# Patient Record
Sex: MALE | Race: WHITE | URBAN - METROPOLITAN AREA
[De-identification: names, ages, dates, MRNs, and addresses within clinical notes are randomized per-mention and may not be internally consistent; named-entity substitution may affect disease eponyms.]

---

## 2017-01-24 ENCOUNTER — TRANSFERRED RECORDS (OUTPATIENT)
Dept: HEALTH INFORMATION MANAGEMENT | Facility: CLINIC | Age: 82
End: 2017-01-24

## 2017-05-01 ENCOUNTER — TRANSFERRED RECORDS (OUTPATIENT)
Dept: HEALTH INFORMATION MANAGEMENT | Facility: CLINIC | Age: 82
End: 2017-05-01

## 2017-05-17 ENCOUNTER — HOSPITAL ENCOUNTER (INPATIENT)
Facility: CLINIC | Age: 82
LOS: 2 days | Discharge: HOME | DRG: 281 | End: 2017-05-19
Attending: EMERGENCY MEDICINE | Admitting: INTERNAL MEDICINE
Payer: MEDICARE

## 2017-05-17 ENCOUNTER — APPOINTMENT (OUTPATIENT)
Dept: GENERAL RADIOLOGY | Facility: CLINIC | Age: 82
DRG: 281 | End: 2017-05-17
Attending: EMERGENCY MEDICINE
Payer: MEDICARE

## 2017-05-17 DIAGNOSIS — R07.9 CHEST PAIN, UNSPECIFIED TYPE: ICD-10-CM

## 2017-05-17 DIAGNOSIS — I21.4 NSTEMI (NON-ST ELEVATED MYOCARDIAL INFARCTION) (H): Primary | ICD-10-CM

## 2017-05-17 LAB
ANION GAP SERPL CALCULATED.3IONS-SCNC: 4 MMOL/L (ref 3–14)
BASOPHILS # BLD AUTO: 0 10E9/L (ref 0–0.2)
BASOPHILS NFR BLD AUTO: 0.4 %
BUN SERPL-MCNC: 16 MG/DL (ref 7–30)
CALCIUM SERPL-MCNC: 8.5 MG/DL (ref 8.5–10.1)
CHLORIDE SERPL-SCNC: 106 MMOL/L (ref 94–109)
CO2 SERPL-SCNC: 29 MMOL/L (ref 20–32)
CREAT SERPL-MCNC: 0.78 MG/DL (ref 0.66–1.25)
DIFFERENTIAL METHOD BLD: NORMAL
EOSINOPHIL # BLD AUTO: 0.1 10E9/L (ref 0–0.7)
EOSINOPHIL NFR BLD AUTO: 1.6 %
ERYTHROCYTE [DISTWIDTH] IN BLOOD BY AUTOMATED COUNT: 14.2 % (ref 10–15)
GFR SERPL CREATININE-BSD FRML MDRD: NORMAL ML/MIN/1.7M2
GLUCOSE SERPL-MCNC: 94 MG/DL (ref 70–99)
HCT VFR BLD AUTO: 43.2 % (ref 40–53)
HGB BLD-MCNC: 13.9 G/DL (ref 13.3–17.7)
IMM GRANULOCYTES # BLD: 0 10E9/L (ref 0–0.4)
IMM GRANULOCYTES NFR BLD: 0.3 %
INR PPP: 2.9 (ref 0.86–1.14)
LYMPHOCYTES # BLD AUTO: 1.7 10E9/L (ref 0.8–5.3)
LYMPHOCYTES NFR BLD AUTO: 25.7 %
MCH RBC QN AUTO: 30.2 PG (ref 26.5–33)
MCHC RBC AUTO-ENTMCNC: 32.2 G/DL (ref 31.5–36.5)
MCV RBC AUTO: 94 FL (ref 78–100)
MONOCYTES # BLD AUTO: 1 10E9/L (ref 0–1.3)
MONOCYTES NFR BLD AUTO: 14.4 %
NEUTROPHILS # BLD AUTO: 3.9 10E9/L (ref 1.6–8.3)
NEUTROPHILS NFR BLD AUTO: 57.6 %
NRBC # BLD AUTO: 0 10*3/UL
NRBC BLD AUTO-RTO: 0 /100
PLATELET # BLD AUTO: 172 10E9/L (ref 150–450)
POTASSIUM SERPL-SCNC: 4.6 MMOL/L (ref 3.4–5.3)
RBC # BLD AUTO: 4.6 10E12/L (ref 4.4–5.9)
SODIUM SERPL-SCNC: 139 MMOL/L (ref 133–144)
TROPONIN I SERPL-MCNC: 0.08 UG/L (ref 0–0.04)
TROPONIN I SERPL-MCNC: 0.53 UG/L (ref 0–0.04)
WBC # BLD AUTO: 6.7 10E9/L (ref 4–11)

## 2017-05-17 PROCEDURE — 36415 COLL VENOUS BLD VENIPUNCTURE: CPT | Performed by: INTERNAL MEDICINE

## 2017-05-17 PROCEDURE — 84484 ASSAY OF TROPONIN QUANT: CPT | Performed by: INTERNAL MEDICINE

## 2017-05-17 PROCEDURE — A9270 NON-COVERED ITEM OR SERVICE: HCPCS | Mod: GY | Performed by: INTERNAL MEDICINE

## 2017-05-17 PROCEDURE — A9270 NON-COVERED ITEM OR SERVICE: HCPCS | Mod: GY | Performed by: EMERGENCY MEDICINE

## 2017-05-17 PROCEDURE — 93005 ELECTROCARDIOGRAM TRACING: CPT

## 2017-05-17 PROCEDURE — 12000007 ZZH R&B INTERMEDIATE

## 2017-05-17 PROCEDURE — 99285 EMERGENCY DEPT VISIT HI MDM: CPT | Mod: 25

## 2017-05-17 PROCEDURE — 99223 1ST HOSP IP/OBS HIGH 75: CPT | Mod: AI | Performed by: INTERNAL MEDICINE

## 2017-05-17 PROCEDURE — 71020 XR CHEST 2 VW: CPT

## 2017-05-17 PROCEDURE — 25000132 ZZH RX MED GY IP 250 OP 250 PS 637: Mod: GY | Performed by: EMERGENCY MEDICINE

## 2017-05-17 PROCEDURE — 85610 PROTHROMBIN TIME: CPT | Performed by: EMERGENCY MEDICINE

## 2017-05-17 PROCEDURE — 80048 BASIC METABOLIC PNL TOTAL CA: CPT | Performed by: EMERGENCY MEDICINE

## 2017-05-17 PROCEDURE — 25000132 ZZH RX MED GY IP 250 OP 250 PS 637: Mod: GY | Performed by: INTERNAL MEDICINE

## 2017-05-17 PROCEDURE — 84145 PROCALCITONIN (PCT): CPT | Performed by: INTERNAL MEDICINE

## 2017-05-17 PROCEDURE — 85025 COMPLETE CBC W/AUTO DIFF WBC: CPT | Performed by: EMERGENCY MEDICINE

## 2017-05-17 PROCEDURE — 84484 ASSAY OF TROPONIN QUANT: CPT | Performed by: EMERGENCY MEDICINE

## 2017-05-17 RX ORDER — HYDROMORPHONE HYDROCHLORIDE 1 MG/ML
.3-.5 INJECTION, SOLUTION INTRAMUSCULAR; INTRAVENOUS; SUBCUTANEOUS
Status: DISCONTINUED | OUTPATIENT
Start: 2017-05-17 | End: 2017-05-19 | Stop reason: HOSPADM

## 2017-05-17 RX ORDER — ONDANSETRON 4 MG/1
4 TABLET, ORALLY DISINTEGRATING ORAL EVERY 6 HOURS PRN
Status: DISCONTINUED | OUTPATIENT
Start: 2017-05-17 | End: 2017-05-19 | Stop reason: HOSPADM

## 2017-05-17 RX ORDER — MULTIVITAMIN,THERAPEUTIC
1 TABLET ORAL DAILY
COMMUNITY

## 2017-05-17 RX ORDER — NALOXONE HYDROCHLORIDE 0.4 MG/ML
.1-.4 INJECTION, SOLUTION INTRAMUSCULAR; INTRAVENOUS; SUBCUTANEOUS
Status: DISCONTINUED | OUTPATIENT
Start: 2017-05-17 | End: 2017-05-19 | Stop reason: HOSPADM

## 2017-05-17 RX ORDER — POLYETHYLENE GLYCOL 3350 17 G/17G
17 POWDER, FOR SOLUTION ORAL DAILY PRN
Status: DISCONTINUED | OUTPATIENT
Start: 2017-05-17 | End: 2017-05-19 | Stop reason: HOSPADM

## 2017-05-17 RX ORDER — SOTALOL HYDROCHLORIDE 120 MG/1
120 TABLET ORAL 2 TIMES DAILY
Status: DISCONTINUED | OUTPATIENT
Start: 2017-05-17 | End: 2017-05-19 | Stop reason: HOSPADM

## 2017-05-17 RX ORDER — SIMVASTATIN 40 MG
40 TABLET ORAL AT BEDTIME
Status: DISCONTINUED | OUTPATIENT
Start: 2017-05-17 | End: 2017-05-19 | Stop reason: HOSPADM

## 2017-05-17 RX ORDER — EZETIMIBE AND SIMVASTATIN 10; 40 MG/1; MG/1
1 TABLET ORAL AT BEDTIME
Status: DISCONTINUED | OUTPATIENT
Start: 2017-05-17 | End: 2017-05-17

## 2017-05-17 RX ORDER — ONDANSETRON 2 MG/ML
4 INJECTION INTRAMUSCULAR; INTRAVENOUS EVERY 6 HOURS PRN
Status: DISCONTINUED | OUTPATIENT
Start: 2017-05-17 | End: 2017-05-19 | Stop reason: HOSPADM

## 2017-05-17 RX ORDER — ACETAMINOPHEN 325 MG/1
650 TABLET ORAL EVERY 4 HOURS PRN
Status: DISCONTINUED | OUTPATIENT
Start: 2017-05-17 | End: 2017-05-19 | Stop reason: HOSPADM

## 2017-05-17 RX ORDER — LIDOCAINE 40 MG/G
CREAM TOPICAL
Status: DISCONTINUED | OUTPATIENT
Start: 2017-05-17 | End: 2017-05-17

## 2017-05-17 RX ORDER — EZETIMIBE 10 MG/1
10 TABLET ORAL AT BEDTIME
Status: DISCONTINUED | OUTPATIENT
Start: 2017-05-17 | End: 2017-05-19 | Stop reason: HOSPADM

## 2017-05-17 RX ORDER — PROCHLORPERAZINE MALEATE 5 MG
5 TABLET ORAL EVERY 6 HOURS PRN
Status: DISCONTINUED | OUTPATIENT
Start: 2017-05-17 | End: 2017-05-19 | Stop reason: HOSPADM

## 2017-05-17 RX ORDER — NITROGLYCERIN 0.4 MG/1
0.4 TABLET SUBLINGUAL
Status: COMPLETED | OUTPATIENT
Start: 2017-05-17 | End: 2017-05-17

## 2017-05-17 RX ORDER — PROCHLORPERAZINE 25 MG
12.5 SUPPOSITORY, RECTAL RECTAL EVERY 12 HOURS PRN
Status: DISCONTINUED | OUTPATIENT
Start: 2017-05-17 | End: 2017-05-19 | Stop reason: HOSPADM

## 2017-05-17 RX ORDER — AMOXICILLIN 250 MG
1-2 CAPSULE ORAL 2 TIMES DAILY PRN
Status: DISCONTINUED | OUTPATIENT
Start: 2017-05-17 | End: 2017-05-19 | Stop reason: HOSPADM

## 2017-05-17 RX ORDER — LISINOPRIL 5 MG/1
5 TABLET ORAL DAILY
Status: DISCONTINUED | OUTPATIENT
Start: 2017-05-18 | End: 2017-05-19

## 2017-05-17 RX ORDER — ASPIRIN 325 MG
325 TABLET ORAL ONCE
Status: COMPLETED | OUTPATIENT
Start: 2017-05-17 | End: 2017-05-18

## 2017-05-17 RX ORDER — OXYCODONE HYDROCHLORIDE 5 MG/1
5-10 TABLET ORAL
Status: DISCONTINUED | OUTPATIENT
Start: 2017-05-17 | End: 2017-05-19 | Stop reason: HOSPADM

## 2017-05-17 RX ORDER — LIDOCAINE 40 MG/G
CREAM TOPICAL
Status: DISCONTINUED | OUTPATIENT
Start: 2017-05-17 | End: 2017-05-19 | Stop reason: HOSPADM

## 2017-05-17 RX ORDER — NITROGLYCERIN 0.4 MG/1
0.4 TABLET SUBLINGUAL EVERY 5 MIN PRN
Status: DISCONTINUED | OUTPATIENT
Start: 2017-05-17 | End: 2017-05-19 | Stop reason: HOSPADM

## 2017-05-17 RX ORDER — EZETIMIBE AND SIMVASTATIN 10; 40 MG/1; MG/1
1 TABLET ORAL AT BEDTIME
COMMUNITY

## 2017-05-17 RX ORDER — WARFARIN SODIUM 2.5 MG/1
2.5 TABLET ORAL ONCE
Status: COMPLETED | OUTPATIENT
Start: 2017-05-17 | End: 2017-05-17

## 2017-05-17 RX ORDER — ASPIRIN 81 MG/1
81 TABLET ORAL DAILY
Status: DISCONTINUED | OUTPATIENT
Start: 2017-05-18 | End: 2017-05-19 | Stop reason: HOSPADM

## 2017-05-17 RX ORDER — BISACODYL 10 MG
10 SUPPOSITORY, RECTAL RECTAL DAILY PRN
Status: DISCONTINUED | OUTPATIENT
Start: 2017-05-17 | End: 2017-05-19 | Stop reason: HOSPADM

## 2017-05-17 RX ORDER — HYDRALAZINE HYDROCHLORIDE 20 MG/ML
10 INJECTION INTRAMUSCULAR; INTRAVENOUS EVERY 4 HOURS PRN
Status: DISCONTINUED | OUTPATIENT
Start: 2017-05-17 | End: 2017-05-19 | Stop reason: HOSPADM

## 2017-05-17 RX ADMIN — WARFARIN SODIUM 2.5 MG: 2.5 TABLET ORAL at 22:06

## 2017-05-17 RX ADMIN — SIMVASTATIN 40 MG: 40 TABLET, FILM COATED ORAL at 22:06

## 2017-05-17 RX ADMIN — SOTALOL HYDROCHLORIDE 120 MG: 120 TABLET ORAL at 22:32

## 2017-05-17 RX ADMIN — NITROGLYCERIN 0.4 MG: 0.4 TABLET SUBLINGUAL at 18:52

## 2017-05-17 RX ADMIN — EZETIMIBE 10 MG: 10 TABLET ORAL at 22:06

## 2017-05-17 ASSESSMENT — ENCOUNTER SYMPTOMS
NAUSEA: 1
FATIGUE: 1
SHORTNESS OF BREATH: 1

## 2017-05-17 NOTE — IP AVS SNAPSHOT
MRN:[id number]                      After Visit Summary   5/17/2017    Fritz Kumar Sr.    MRN: [id number]           Thank you!     Thank you for choosing [facility] for your care. Our goal is always to provide you with excellent care. Hearing back from our patients is one way we can continue to improve our services. Please take a few minutes to complete the written survey that you may receive in the mail after you visit. If you would like to speak to someone directly about your visit please contact Patient Relations at [phone]. Thank you!          Patient Information     Date Of Birth          9/12/1930        Designated Caregiver       Most Recent Value    Caregiver    Will someone help with your care after discharge? yes    Name of designated caregiver Margaret    Phone number of caregiver [phone]    Caregiver address [address], [place] , [address]      About your hospital stay     You were admitted on:  May 17, 2017 You last received care in the:  [place] [address] [facility]    You were discharged on:  May 19, 2017       Who to Call     For medical emergencies, please call 911.  For non-urgent questions about your medical care, please call your primary care provider or clinic, None          Attending Provider     Provider Specialty    Sandra Avila MD Emergency Medicine    Don Tavera,  Internal Medicine       Primary Care Provider    Physician No Ref-Primary       No address on file        After Care Instructions     Activity       Your activity upon discharge: activity as tolerated            Diet       Follow this diet upon discharge:       Regular Diet Adult                  Follow-up Appointments     Follow-up and recommended labs and tests        See primary MD on return back home  New medications:  Baby aspirin and Imdur                  Warfarin Instruction     You have started taking a medicine called warfarin. This is a  "blood-thinning medicine (anticoagulant). It helps prevent and treat blood clots.      Before leaving the hospital, make sure you know how much to take and how long to take it.      You will need regular blood tests to make sure your blood is clotting safely. It is very important to see your doctor for regular blood tests.    Talk to your doctor before taking any new medicine (this includes over-the-counter drugs and herbal products). Many medicines can interact with warfarin. This may cause more bleeding or too much clotting.     Eating a lot of vitamin K--found in green, leafy vegetables--can change the way warfarin works in your body. Do NOT avoid these foods. Instead, try to eat the same amount each day.     Bleeding is the most common side-effect of warfarin. You may notice bleeding gums, a bloody nose, bruises and bleeding longer when you cut yourself. See a doctor at once if:   o You cough up blood  o You find blood in your stool (poop)  o You have a deep cut, or a cut that bleeds longer than 10 minutes   o You have a bad cut, hard fall, accident or hit your head (go to urgent care or the emergency room).    For women who can get pregnant: This medicine can harm an unborn baby. Be very careful not to get pregnant while taking this medicine. If you think you might be pregnant, call your doctor right away.    For more information, read \"Guide to Warfarin Therapy,  the booklet you received in the hospital.        Pending Results     No orders found from 5/15/2017 to 5/18/2017.            Statement of Approval     Ordered          05/19/17 1354  I have reviewed and agree with all the recommendations and orders detailed in this document.  EFFECTIVE NOW     Approved and electronically signed by:  Branden Kathleen MD             Admission Information     Date & Time Provider Department Dept. Phone    5/17/2017 Don Tavera, DO [place] [address] [facility] [phone]      Your Vitals Were     Blood " "Pressure Pulse Temperature Respirations Height Weight    93/55 (BP Location: Left arm) 62 97.1  F (36.2  C) (Oral) 16 1.778 m (5' 10\") 82.6 kg (182 lb)    Pulse Oximetry BMI (Body Mass Index)                95% 26.11 kg/m2          [employer] Information     [employer] lets you send messages to your doctor, view your test results, renew your prescriptions, schedule appointments and more. To sign up, go to [email or web address] . Click on \"Log in\" on the left side of the screen, which will take you to the Welcome page. Then click on \"Sign up Now\" on the right side of the page.     You will be asked to enter the access code listed below, as well as some personal information. Please follow the directions to create your username and password.     Your access code is: [email or web address]  Expires: 2017  2:06 PM     Your access code will  in 90 days. If you need help or a new code, please call your [facility] clinic or [phone].        Care EveryWhere ID     This is your Care EveryWhere ID. This could be used by other organizations to access your [facility] medical records  [id number]           Review of your medicines      START taking        Dose / Directions    aspirin 81 MG EC tablet        Dose:  81 mg   Take 1 tablet (81 mg) by mouth daily   Refills:  0       isosorbide mononitrate 30 MG 24 hr tablet   Commonly known as:  IMDUR        Dose:  15 mg   Take 0.5 tablets (15 mg) by mouth daily   Quantity:  30 tablet   Refills:  1         CONTINUE these medicines which have NOT CHANGED        Dose / Directions    ezetimibe-simvastatin 10-40 MG per tablet   Commonly known as:  VYTORIN        Dose:  1 tablet   Take 1 tablet by mouth At Bedtime   Refills:  0       multivitamin, therapeutic Tabs tablet        Dose:  1 tablet   Take 1 tablet by mouth daily   Refills:  0       PRESERVISION AREDS PO        Dose:  1 tablet   Take 1 tablet by mouth daily   Refills:  0       SAW PALMETTO CONCENTRATE OR        Dose:  1 tablet "   Take 1 tablet by mouth daily   Refills:  0       SOTALOL HCL PO        Dose:  120 mg   Take 120 mg by mouth 2 times daily   Refills:  0       WARFARIN SODIUM PO        Dose:  2.5 mg   Take 2.5 mg by mouth daily   Refills:  0            Where to get your medicines      These medications were sent to [place], [place] - [address] [facility]  [address] [facility] [address]     Phone:  [phone]     isosorbide mononitrate 30 MG 24 hr tablet         Some of these will need a paper prescription and others can be bought over the counter. Ask your nurse if you have questions.     You don't need a prescription for these medications     aspirin 81 MG EC tablet                Protect others around you: Learn how to safely use, store and throw away your medicines at www.disposemymeds.org.             Medication List: This is a list of all your medications and when to take them. Check marks below indicate your daily home schedule. Keep this list as a reference.      Medications           Morning Afternoon Evening Bedtime As Needed    aspirin 81 MG EC tablet   Take 1 tablet (81 mg) by mouth daily   Last time this was given:  81 mg on 5/19/2017  8:00 AM            8:00am                       ezetimibe-simvastatin 10-40 MG per tablet   Commonly known as:  VYTORIN   Take 1 tablet by mouth At Bedtime                        9:00pm           isosorbide mononitrate 30 MG 24 hr tablet   Commonly known as:  IMDUR   Take 0.5 tablets (15 mg) by mouth daily   Last time this was given:  15 mg on 5/19/2017 12:27 PM            8:00am                       multivitamin, therapeutic Tabs tablet   Take 1 tablet by mouth daily            8:00am                       PRESERVISION AREDS PO   Take 1 tablet by mouth daily            8:00am                       SAW PALMETTO CONCENTRATE OR   Take 1 tablet by mouth daily            8:00am                       SOTALOL HCL PO   Take 120 mg by mouth 2 times  daily   Last time this was given:  120 mg on 5/19/2017  8:00 AM            8:00am               9:00pm           WARFARIN SODIUM PO   Take 2.5 mg by mouth daily   Last time this was given:  2.5 mg on 5/18/2017  5:50 PM                    6:00pm                         More Information        Back at Home  Once you re home, your goal for the first week or so is to take it easy. Then, slowly return to regular activities. It may take about 4 to 8 weeks to get back to your normal routine. To ease the transition, allow yourself to rely on family and friends for support, and be easy on yourself.  Let friends and family support you    Don t try to do it all alone. Ask family or friends for help. They may be glad to do something to show their concern. For instance:    Let others help with chores, such as washing dishes, preparing meals, or buying groceries.    Ask a family member or friend to join you in relaxing activities, such as playing games or watching movies.  Be easy on yourself  As you begin your recovery, don t push yourself too hard. Remember, you re healing physically and emotionally. Keep these tips in mind:    Take your medications as prescribed by your doctor.    Avoid activities that may cause chest pain or shortness of breath.    Avoid exertion, excitement, and exposure to cold after a heavy meal.    If you re feeling low, don t beat yourself up. Take your recovery one day at a time and don t give in to these feelings by staying in bed. Be sure to get up and get dressed each morning.  For family and friends  Help your loved one ease into recovery:    Offer to drive your loved one to medical appointments.    Help your loved one remember to take medications.    Encourage your loved one to slowly be more independent.    Spend time relaxing with your loved one. You don t have to just sit around, try going for a walk.    7617-6642 [employer]. [address] [place] [address], [place] [address]. All rights  reserved. This information is not intended as a substitute for professional medical care. Always follow your healthcare professional's instructions.                Eating Heart-Healthy Foods  Eating has a big impact on your heart health. In fact, eating healthier can improve several of your heart risks at once. For instance, it helps you manage weight, cholesterol, and blood pressure. Here are ideas to help you make heart-healthy changes without giving up all the foods and flavors you love.  Getting started    Talk with your health care provider about eating plans, such as the DASH or Mediterranean diet. You may also be referred to a dietitian.    Change a few things at a time. Give yourself time to get used to a few eating changes before adding more.    Work to create a tasty, healthy eating plan that you can stick to for the rest of your life.    Goals for healthy eating  Below are some tips to improve your eating habits:    Limit saturated fats and trans fats. Saturated fats raise your levels of cholesterol, so keep these fats to a minimum. They are found in foods such as fatty meats, whole milk, cheese, and palm and coconut oils. Avoid trans fats because they lower good cholesterol as well as raise bad cholesterol. Trans fats are most often found in processed foods.    Reduce sodium (salt) intake. Eating too much salt may increase your blood pressure. Limit your sodium intake to 2,300 milligrams (mg) per day, or less if your health care provider recommends it. Dining out less often and eating fewer processed foods are two great ways to decrease the amount of salt you consume.    Managing calories. A calorie is a unit of energy. Your body burns calories for fuel, but if you eat more calories than your body burns, the extras are stored as fat. Your health care provider can help you create a diet plan to manage your calories. This will likely include eating healthier foods as well as exercising regularly. To help  you track your progress, keep a diary to record what you eat and how often you exercise.  Choose the right foods  Aim to make these foods staples of your diet. If you have diabetes, you may have different recommendations than what is listed here:    Fruits and vegetable provide plenty of nutrients without a lot of calories. At meals, fill half your plate with these foods. Split the other half of your plate between whole grains and lean protein.    Whole grains are high in fiber and rich in vitamins and nutrients. Good choices include whole-wheat bread, pasta, and brown rice.    Lean proteins give you nutrition with less fat. Good choices include fish, skinless chicken, and beans.    Low-fat or nonfat dairy provides nutrients without a lot of fat. Try low-fat or nonfat milk, cheese, or yogurt.    Healthy fats can be good for you in small amounts. These are unsaturated fats, such as olive oil, nuts, and fish. Try to have at least 2 servings per week of fatty fish such as salmon, sardines, mackerel, rainbow trout, and albacore tuna. These contain omega-3 fatty acids, which are good for your heart. Flaxseed is another source of a heart-healthy fat.  More on heart healthy eating    Read food labels  Healthy eating starts at the grocery store. Be sure to pay attention to food labels on packaged foods. Look for products that are high in fiber and protein, and low in saturated fat, cholesterol, and sodium. Avoid products that contain trans fat. And pay close attention to serving size. For instance, if you plan to eat two servings, double all the numbers on the label.  Prepare food right  A key part of healthy cooking is cutting down on added fat and salt. Look on the internet for lower-fat, lower-sodium recipes. Also, try these tips:    Remove fat from meat and skin from poultry before cooking.    Skim fat from the surface of soups and sauces.    Broil, boil, bake, steam, grill, and microwave food without added  fats.    Choose ingredients that spice up your food without adding calories, fat, or sodium. Try these items: horseradish, hot sauce, lemon, mustard, nonfat salad dressings, and vinegar. For salt-free herbs and spices, try basil, cilantro, cinnamon, pepper, and rosemary.    6061-4310 [employer]. [address] [place] [address], [place] [address]. All rights reserved. This information is not intended as a substitute for professional medical care. Always follow your healthcare professional's instructions.

## 2017-05-17 NOTE — ED PROVIDER NOTES
"  History     Chief Complaint:  Chest Pain      HPI   Fritz Kumar Sr. is a 86 year old male with a history of atrial flutter quad bypass with pacemaker who presents  to the emergency department today for evaluation of chest pain. Mr. Kumar is  visiting from [place] and came in today. The patient had a sudden onset of intermittent chest discomfort lasting few minutes starting at 10:00. Since, the patient's symptom's increased with frequency with shortness of breath, generalized weakness, and nausea. The patient notes he hasn't had heart issues since his pacemaker placement and came in for evaluation as a pre-caution.     Allergies:  Sulfa drugs    Medications:    Sotalol hcl po  Ezetimibe-simvastatin (vytorin) 10-40 mg per tablet  Warfarin sodium po  Multivitamin, therapeutic (thera-vit) tabs tablet  Saw palmetto, serenoa repens, (saw palmetto concentrate or)  Polyethylene glycol     Past Medical History:    Hypertension  Hyperlipidemia  Atrial flutter  Herniated disc  BPH  Pacemaker  Ischemic heart disease  A-fib  Dyslipidemia  COPD  SCOUT    Past Surgical History:    Appendectomy  Angipplasty  Joint replacement  Laminectomy  Heart bypass      Family History:    CAD    Social History:  Marital Status:   [2]  Tobacco: Former smoker  Alcohol: Positive  Presents with spouse and son.     Review of Systems   Constitutional: Positive for fatigue.   Respiratory: Positive for shortness of breath.    Cardiovascular: Positive for chest pain.   Gastrointestinal: Positive for nausea.   All other systems reviewed and are negative.    Physical Exam   First Vitals:  BP: (!) 135/94  Pulse: 62  Temp: 97.8  F (36.6  C)  Resp: 16  Height: 177.8 cm (5' 10\")  Weight: 82.6 kg (182 lb)  SpO2: 98 %    Physical Exam   Constitutional: He appears well-developed and well-nourished.   HENT:   Right Ear: External ear normal.   Left Ear: External ear normal.   Mouth/Throat: Oropharynx is clear and moist. No oropharyngeal exudate. "   Eyes: Conjunctivae are normal. Pupils are equal, round, and reactive to light. No scleral icterus.   Neck: Normal range of motion. Neck supple.   Cardiovascular: Normal rate, regular rhythm, normal heart sounds and intact distal pulses.  Exam reveals no gallop and no friction rub.    No murmur heard.  Pulmonary/Chest: Effort normal and breath sounds normal. No respiratory distress. He has no wheezes. He has no rales.   Abdominal: Soft. Bowel sounds are normal. He exhibits no distension and no mass. There is no tenderness.   Musculoskeletal: Normal range of motion. He exhibits no edema.   Neurological: He is alert.   Skin: Skin is warm and dry. No rash noted.   Psychiatric: He has a normal mood and affect.     Emergency Department Course   EC 16:35:04  Indication: chest pain  Findings:    Ventricular pace rhythm   Abnormal ECG..   Ventricular rate: 63 bpm  GA interval: * ms  QRS duration: 184 ms  QT/QTc: 510/521 ms  R-Axis: -47  ECG read at 16:35 by Dr. Avila.    Imaging:  Radiographic findings were communicated with the patient who voiced understanding of the findings.    Chest X-Ray. 2 Views:   Elevated left diaphragm. Left lower lobe infiltrate. Pacemaker in the heart. Patchy infiltrate at the right lung base. Sternal wires. Normal heart size and pulmonary vascularity.  Final reading per radiology.     Laboratory:  CBC:  WBC 6.7, HGB 13.9, , otherwise WNL  BMP: WNL. (Creatinine 0.78)  Troponin : 0.076  INR: 2.90    Interventions:  18:52 Nitroglycerin 0.4 mg Sublingual    Emergency Department Course:  16:35 EKG obtained in the ED, see results above.     Nursing notes and vitals reviewed.    17:23 I performed an exam of the patient as documented above.     Blood drawn. Urine collected. This was sent to the lab for further testing, results above.    The patient was taken for x-ray, see imaging results above.     The patient received the intervention(s) above.    19:53 Recheck patient. Findings  and plan explained to the Patient who consents to admission.     20:06 Discussed the patient with Dr. Tavera, who will admit the patient to a telemetry bed for further monitoring, evaluation, and treatment.    Impression & Plan    Medical Decision Making:  Fritz Kumar Sr. is a 86 year old male with history of CAD status post bypass as well as pacemaker pacemaker placement who is here of chest pain,. The chest pain pressure is mainly centered and is very minimal on the exam. Patient initially declined the nitroglycerin and pain medication. He has mildly elevated troponin, but the EKG was paced rhythm with no elevation to suggest an acute MI. However, due to the elevated troponin, I recommended elevation with serial troponin. He agreed to undergo evaluation in that aspect. At this pint, I do not see any need invasive intervention unless troponin elevates further or if he has more pain. He is admitted to observation unit tele. This was discussed with him and his wife and they were comfortable with plan. He is admitted by Dr. Tavera    Diagnosis:    ICD-10-CM    1. Chest pain, unspecified type R07.9        Disposition:  Admitted to telemetry bed under the care of Dr. Tavera    Scribe Disclosure:  I, Nadege Terrazas, am serving as a scribe at 5:23 PM on 5/17/2017 to document services personally performed by Sandra Avila MD, based on my observations and the provider's statements to me.  Nadege Terrazas  5/17/2017   [facility] EMERGENCY DEPARTMENT       Sandra Avila MD  05/17/17 1363

## 2017-05-17 NOTE — ED NOTES
Increased shortness of breath and chest pain started this am around 10am.  Associated weakness and nausea.

## 2017-05-17 NOTE — IP AVS SNAPSHOT
[place] [address] [facility]    [address]    [facility] [address]    Phone:  [phone]    Fax:  [phone]                                       After Visit Summary   5/17/2017    Fritz Kumar Sr.    MRN: [id number]           After Visit Summary Signature Page     I have received my discharge instructions, and my questions have been answered. I have discussed any challenges I see with this plan with the nurse or doctor.    ..........................................................................................................................................  Patient/Patient Representative Signature      ..........................................................................................................................................  Patient Representative Print Name and Relationship to Patient    ..................................................               ................................................  Date                                            Time    ..........................................................................................................................................  Reviewed by Signature/Title    ...................................................              ..............................................  Date                                                            Time

## 2017-05-18 ENCOUNTER — APPOINTMENT (OUTPATIENT)
Dept: NUCLEAR MEDICINE | Facility: CLINIC | Age: 82
DRG: 281 | End: 2017-05-18
Attending: INTERNAL MEDICINE
Payer: MEDICARE

## 2017-05-18 LAB
ANION GAP SERPL CALCULATED.3IONS-SCNC: 5 MMOL/L (ref 3–14)
BUN SERPL-MCNC: 16 MG/DL (ref 7–30)
CALCIUM SERPL-MCNC: 8.4 MG/DL (ref 8.5–10.1)
CHLORIDE SERPL-SCNC: 106 MMOL/L (ref 94–109)
CO2 SERPL-SCNC: 28 MMOL/L (ref 20–32)
CREAT SERPL-MCNC: 0.75 MG/DL (ref 0.66–1.25)
GFR SERPL CREATININE-BSD FRML MDRD: ABNORMAL ML/MIN/1.7M2
GLUCOSE SERPL-MCNC: 89 MG/DL (ref 70–99)
INR PPP: 2.9 (ref 0.86–1.14)
INTERPRETATION ECG - MUSE: NORMAL
INTERPRETATION ECG - MUSE: NORMAL
POTASSIUM SERPL-SCNC: 4.3 MMOL/L (ref 3.4–5.3)
PROCALCITONIN SERPL-MCNC: NORMAL NG/ML
SODIUM SERPL-SCNC: 139 MMOL/L (ref 133–144)
TROPONIN I SERPL-MCNC: 1.35 UG/L (ref 0–0.04)
TROPONIN I SERPL-MCNC: 6.83 UG/L (ref 0–0.04)

## 2017-05-18 PROCEDURE — 93005 ELECTROCARDIOGRAM TRACING: CPT

## 2017-05-18 PROCEDURE — 84484 ASSAY OF TROPONIN QUANT: CPT | Performed by: INTERNAL MEDICINE

## 2017-05-18 PROCEDURE — 93016 CV STRESS TEST SUPVJ ONLY: CPT | Performed by: INTERNAL MEDICINE

## 2017-05-18 PROCEDURE — 78452 HT MUSCLE IMAGE SPECT MULT: CPT

## 2017-05-18 PROCEDURE — A9270 NON-COVERED ITEM OR SERVICE: HCPCS | Mod: GY | Performed by: INTERNAL MEDICINE

## 2017-05-18 PROCEDURE — 36415 COLL VENOUS BLD VENIPUNCTURE: CPT | Performed by: INTERNAL MEDICINE

## 2017-05-18 PROCEDURE — 99233 SBSQ HOSP IP/OBS HIGH 50: CPT | Performed by: INTERNAL MEDICINE

## 2017-05-18 PROCEDURE — 99221 1ST HOSP IP/OBS SF/LOW 40: CPT | Performed by: INTERNAL MEDICINE

## 2017-05-18 PROCEDURE — 80048 BASIC METABOLIC PNL TOTAL CA: CPT | Performed by: INTERNAL MEDICINE

## 2017-05-18 PROCEDURE — 34300033 ZZH RX 343: Performed by: INTERNAL MEDICINE

## 2017-05-18 PROCEDURE — 78452 HT MUSCLE IMAGE SPECT MULT: CPT | Mod: 26 | Performed by: INTERNAL MEDICINE

## 2017-05-18 PROCEDURE — 25000132 ZZH RX MED GY IP 250 OP 250 PS 637: Mod: GY | Performed by: INTERNAL MEDICINE

## 2017-05-18 PROCEDURE — 12000007 ZZH R&B INTERMEDIATE

## 2017-05-18 PROCEDURE — 93018 CV STRESS TEST I&R ONLY: CPT | Performed by: INTERNAL MEDICINE

## 2017-05-18 PROCEDURE — 93010 ELECTROCARDIOGRAM REPORT: CPT | Performed by: INTERNAL MEDICINE

## 2017-05-18 PROCEDURE — 25000128 H RX IP 250 OP 636

## 2017-05-18 PROCEDURE — A9502 TC99M TETROFOSMIN: HCPCS | Performed by: INTERNAL MEDICINE

## 2017-05-18 PROCEDURE — 93017 CV STRESS TEST TRACING ONLY: CPT

## 2017-05-18 PROCEDURE — 85610 PROTHROMBIN TIME: CPT | Performed by: INTERNAL MEDICINE

## 2017-05-18 RX ORDER — REGADENOSON 0.08 MG/ML
INJECTION, SOLUTION INTRAVENOUS
Status: COMPLETED
Start: 2017-05-18 | End: 2017-05-18

## 2017-05-18 RX ORDER — WARFARIN SODIUM 2.5 MG/1
2.5 TABLET ORAL
Status: COMPLETED | OUTPATIENT
Start: 2017-05-18 | End: 2017-05-18

## 2017-05-18 RX ADMIN — SIMVASTATIN 40 MG: 40 TABLET, FILM COATED ORAL at 23:24

## 2017-05-18 RX ADMIN — Medication 32.1 MCI.: at 14:21

## 2017-05-18 RX ADMIN — ASPIRIN 81 MG: 81 TABLET, COATED ORAL at 08:54

## 2017-05-18 RX ADMIN — EZETIMIBE 10 MG: 10 TABLET ORAL at 23:24

## 2017-05-18 RX ADMIN — WARFARIN SODIUM 2.5 MG: 2.5 TABLET ORAL at 17:50

## 2017-05-18 RX ADMIN — ASPIRIN 325 MG ORAL TABLET 325 MG: 325 PILL ORAL at 00:40

## 2017-05-18 RX ADMIN — SOTALOL HYDROCHLORIDE 120 MG: 120 TABLET ORAL at 08:54

## 2017-05-18 RX ADMIN — NITROGLYCERIN 0.4 MG: 0.4 TABLET SUBLINGUAL at 04:41

## 2017-05-18 RX ADMIN — LISINOPRIL 5 MG: 5 TABLET ORAL at 08:54

## 2017-05-18 RX ADMIN — REGADENOSON 0.4 MG: 0.08 INJECTION, SOLUTION INTRAVENOUS at 14:10

## 2017-05-18 RX ADMIN — TETROFOSMIN 11 MCI.: 0.23 INJECTION, POWDER, LYOPHILIZED, FOR SOLUTION INTRAVENOUS at 12:32

## 2017-05-18 NOTE — CONSULTS
"CARDIOLOGY CONSULTATION       REQUESTING PHYSICIAN:  Dr. Branden Kathleen.       CHIEF COMPLAINT:  We were asked by Dr. Branden Kathleen to see Fritz (Óscar Kumar because of acute medical issues noted below including unstable angina, trace myocardial infarction, history of coronary artery disease.      Mr. Kumar is 86; he has been a relatively healthy man.  In 2002 in [place], he had a 4-vessel bypass; we do not have his anatomy at this time.  He subsequently has had no further angiograms, but he has had \"small heart attacks\" subsequently.  He has not carried a diagnosis of heart failure.      He was admitted to [facility] yesterday, 05/17/2017 with ongoing chest discomfort.  He has not had any chest pain recently.  He exercises 6 days a week on an exercise bike and has had no symptoms.  He and his wife live in [place], but they flew in yesterday to [place] to visit with family including children, grandchildren and great-grandchildren.      They were at [employer] doing some shopping when he noted the onset of a vague mid lower chest discomfort.  It did not go into the neck, jaw, arms or back, but it clearly present.  It hassled him on and off through the afternoon; he first noted the symptoms around 10:00 a.m.  By 4:00 in the afternoon, it had been recurrent and annoying enough that he finally admitted to his wife that he was uncomfortable and he also had associated symptoms of nausea.  No dyspnea, palpitations, dizziness, or syncope.  No lower leg edema.      He came to the emergency room; his EKG was not helpful as he has both an A-paced/V-paced rhythm with intermittent A-sense and a V-paced rhythm.  Troponins were trace positive at 0.07, that went 0.5, down to 1.3, and today 6.8.  He has had a vague sense of persistent symptoms, but not dramatically so.  He has had 2 nitroglycerins in the ER and last night; neither really seemed to change his symptoms at all.  They were very vague, very " fleeting.      PAST MEDICAL HISTORY:  Includes:     1.  Coronary artery disease with previous 5-vessel bypass.   2.  Previous myocardial infarction, status injuries.   3.  History of atrial flutter treated with sotalol.   4.  Hypertension.   5.  Hyperlipidemia.      MEDICATIONS PRIOR TO ADMISSION:   1.  Warfarin to an INR of 2.0-3.0.   2.  Sotalol 120 mg b.i.d., presumably for treatment of his atrial arrhythmias.   3.  Vytorin 10/40 mg a day.   4.  Vitamins daily.   5.  Saw palmetto daily.      SOCIAL HISTORY:  He is retired; he used to work for [employer].  He stopped smoking 40 years ago.  He has 1-2 alcoholic beverages most days of the week.      FAMILY HISTORY:  Positive for both mother and father having coronary disease.      REVIEW OF SYSTEMS:     IN GENERAL:  His energy is decent, he has not had angina until yesterday.  He has felt well and exercises regularly.   HEENT:  Negative.   UPPER GASTROINTESTINAL:  Negative.   LOWER GASTROINTESTINAL:  Negative.   GENITOURINARY:  Negative.   MUSCULOSKELETAL:  Negative except for some achiness of the right leg when he walks.    NEUROLOGIC, ENDOCRINE, PSYCHIATRIC:  Negative.      ALLERGIES:  None specified.      PHYSICAL EXAMINATION:   IN GENERAL:  Well-developed, well-nourished male.  He looks a bit younger than his stated age.   VITAL SIGNS:  Blood pressures have been normal in the 120//60.  O2 sats 95-97%, body weight on admission was 182 pounds.   HEAD:  Normal.   NECK:  Free of neck vein distention or bruit.   HEART:  Regular without gallop or murmur.   LUNGS:  Clear.  Sternum is solid and well-healed.   ABDOMEN:  Soft without organomegaly, mass or pain.  No guarding noted.   EXTREMITIES:  Showed diminished pedal pulses, no edema.  Radial pulse +2.      Fritz Kumar is 86, he has been relatively healthy.  He has had positive troponins.  His chest pain has quieted.  His EKG is diagnostically useless.  I have discussed trying to stratify his risk of  ongoing cardiac events with a stress study versus an angiogram.  After a long discussion, we agreed to do a Lexiscan thallium and we will see if that helps us with our risk assessment.  My threshold for cathing him would be low.      He does have a friend at the [facility]; if need be, I will touch base with him also.      IMPRESSION:   1.  Unstable angina with trace myocardial infarction, location unclear.   2.  Chronic multiple vessel coronary disease post 4-vessel bypass.   3.  No signs of left to right heart failure.   4.  Treated hypertension.   5.  Treated hyperlipidemia.   6.  Chronic sotalol therapy, which in a sense has been contributing a beta blocker side effect for him.  He is normotensive and currently I am not going to substitute another beta blocker, but we will see how he does.      Lipid profiles have not been checked.  His renal function is normal.  Hemoglobin 13.9 grams.  I believe this is an anginal equivalent with some component of progressive coronary disease; we will try to separate noncritical coronary disease versus significant inducible ischemia.  Should he have recurrent/ongoing chest discomfort despite the above plan, we will proceed with coronary and graft angiography; he understands this.      Over an hour was spent doing this consult as noted above with multiple avenues, medications, lab assessment, imaging reviewed.  It should be noted his chest x-ray showed clear lungs, left what appears to be chronic pleural effusion.  He has a dual-chamber pacemaker in place, no heart failure as noted.         VIN BOWENS MD, [facility]             D: 2017 11:46   T: 2017 13:50   MT: [id number]      Name:     ISHAAN MEANS   MRN:      [id number]        Account:       [id number]   :      1930           Consult Date:  2017      Document: [id number]       cc: Branden Kathleen MD

## 2017-05-18 NOTE — PLAN OF CARE
Problem: Goal Outcome Summary  Goal: Goal Outcome Summary  Outcome: No Change  Patient Progress:  No Change  Outcome Summary:  Temp: 98.8  F (37.1  C) Temp src: Oral BP: 104/65 Pulse: 62 Heart Rate: 65 Resp: 18 SpO2: 99 % O2 Device: None (Room air)       VSS, A&O x4, up with SBA. Pt troponins elevated, Denies, SOB and chest pain, but says that his chest does not feel normal. EKG ordered. MD read and no change to previous. Pt given 1 nitro sublingual tablet. Tele is 100% v-paced with prolonged QT interval.

## 2017-05-18 NOTE — PHARMACY-ANTICOAGULATION SERVICE
Clinical Pharmacy - Warfarin Dosing Consult     Pharmacy has been consulted to manage this patient s warfarin therapy.  Indication: Atrial Fibrillation  Therapy Goal: INR 2-3  Warfarin Prior to Admission: Yes  Warfarin PTA Regimen: 2.5mg daily  Significant drug interactions: zocor, Asa    INR   Date Value Ref Range Status   05/17/2017 2.90 (H) 0.86 - 1.14 Final       Recommend warfarin 2.5 mg today.  Pharmacy will monitor Fritz Kumar Sr. daily and order warfarin doses to achieve specified goal.      Please contact pharmacy as soon as possible if the warfarin needs to be held for a procedure or if the warfarin goals change.

## 2017-05-18 NOTE — PROGRESS NOTES
"  [facility]  Hospitalist Progress Note  Branden Kathleen MD 05/18/2017    Reason for Stay (Diagnosis): NSTEMI         Assessment and Plan:      Summary of Stay: Fritz Kumar Sr. is a 86-year-old male with non-ST elevation myocardial infarction.   1. Non-ST elevation myocardial infarction: appreciate cards input.  Stress test planned - if NM test positive may need cath  2. LLL infiltate - suspect atelectasis.  No PNA present.  procalcitonin neg.  WBC normal.  No fever.  No antibiotics indicated  3. Hyperlipidemia: Continue Vytorin.   4. Hypertension: Added lisinopril 5 mg a day. Have IV hydralazine available as needed. Continue sotalol.   5. Atrial flutter: Continue sotalol, continue warfarin.       DVT Prophylaxis: Warfarin  Code Status: Full Code  Discharge Dispo: [place]  Estimated Disch Date / # of Days until Disch: 1-2 days, pending stress test result        Interval History (Subjective):      No CP, no SOB                  Physical Exam:      Last Vital Signs:  /81 (BP Location: Left arm)  Pulse 62  Temp 97.3  F (36.3  C) (Oral)  Resp 18  Ht 1.778 m (5' 10\")  Wt 82.6 kg (182 lb)  SpO2 95%  BMI 26.11 kg/m2      Intake/Output Summary (Last 24 hours) at 05/18/17 1150  Last data filed at 05/18/17 0045   Gross per 24 hour   Intake              240 ml   Output              275 ml   Net              -35 ml       Constitutional: Awake, alert, cooperative, no apparent distress.  Wife present at bedside   Respiratory: Clear to auscultation bilaterally, no crackles or wheezing   Cardiovascular: Regular rate and rhythm, normal S1 and S2, and no murmur noted   Abdomen: Normal bowel sounds, soft, non-distended, non-tender   Skin: No rashes, no cyanosis, dry to touch   Neuro: Alert and oriented x3, no weakness, numbness, memory loss   Extremities: No edema, normal range of motion   Other(s):        All other systems: Negative          Medications:      All current medications were reviewed " with changes reflected in problem list.         Data:      All new lab and imaging data was reviewed.   Labs:    Recent Labs  Lab 05/17/17  1730   WBC 6.7   HGB 13.9   HCT 43.2   MCV 94         Imaging:   Recent Results (from the past 24 hour(s))   XR Chest 2 Views    Narrative    CHEST TWO VIEWS  5/17/2017 6:03 PM     HISTORY: Chest pain.    COMPARISON: None.      Impression    IMPRESSION: Elevated left diaphragm. Left lower lobe infiltrate.  Pacemaker in the heart. Patchy infiltrate at the right lung base.  Sternal wires. Normal heart size and pulmonary vascularity.    LUKAS BATEMAN MD

## 2017-05-18 NOTE — ED NOTES
[facility]  ED Nurse Handoff Report    Fritz Kumar Sr. is a 86 year old male   ED Chief complaint: Chest Pain  . ED Diagnosis:   Final diagnoses:   Chest pain, unspecified type     Allergies:   Allergies   Allergen Reactions     Sulfa Drugs Unknown       Code Status: DNR / DNI  Activity level - Baseline/Home:  Independent. Activity Level - Current:   Independent. Lift room needed: No. Bariatric: No   Needed: No   Isolation: No. Infection: Not Applicable.     Vital Signs:   Vitals:    05/17/17 1845 05/17/17 1900 05/17/17 1915 05/17/17 1930   BP: (!) 146/101 117/81 132/84 (!) 132/95   Pulse:       Resp:       Temp:       TempSrc:       SpO2: 96% 94% 95% 96%   Weight:       Height:         Cardiac Rhythm:  ,   Cardiac  Cardiac Rhythm: Normal sinus rhythm  Pain level: 0-10 Pain Scale: 4  Patient confused: No. Patient Falls Risk: No.   Elimination Status: Has not voided in ED   Patient Report - Initial Complaint: Chest pain and pressure, shortness of breath, weakness and some nausea since 1000 today. Focused Assessment:  Continues to have slight midsternal chest pain/pressure.  Denies further nausea.  Lung sounds clear bilaterally.  Paced Rhythm.     Tests Performed: Labs and chest xray. Abnormal Results: Elevated Troponin first draw 0.076   Treatments provided: patient given one dose of nitro.  Has refused further nitro at this time as he wants as few treatments as possible.    Family Comments: Patient is in town visiting family from [place].  Wife is a nurse and patient seems to look to her as his main source of decisions.  Patient has a pacemaker, Atrial flutter, BPH, Atrial fib, Hypertension, Dyslipidemia, COPD, SCOUT, MI, CABG.  OBS brochure/video discussed/provided to patient:  Yes  ED Medications:   Medications   lidocaine 1 % 1 mL (not administered)   lidocaine (LMX4) kit (not administered)   sodium chloride (PF) 0.9% PF flush 3 mL (not administered)   sodium chloride (PF) 0.9% PF  flush 3 mL (not administered)   nitroglycerin (NITROSTAT) sublingual tablet 0.4 mg (0.4 mg Sublingual Given 5/17/17 4142)     Drips infusing:  No     ED Nurse Name/Phone Number: Manisha Nava,   7:52 PM  RECEIVING UNIT ED HANDOFF REVIEW    Above ED Nurse Handoff Report was reviewed:Yes  Reviewed by: Kait Ruiz on May 17, 2017 at 8:34 PM

## 2017-05-18 NOTE — CONSULTS
Fritz Kumar is an 86-year-old male with history of multiple previous heart attacks and previous coronary artery bypass graft procedure who has not had any problems since his coronary artery bypass graft several years ago. Developed chest pain today (5-17-17) around 10:00 a.m., has had intermittent pain throughout the day, has not noted anything that has made the pain better or worse. The pain is a pressure sensation in the center of his chest that does not seem to radiate anywhere else. Has felt some nausea with this, but has not had any shortness of breath, no cough, no fevers or chills. No other complaints at this time, and also he has not been around anyone else sick that he knows of.     PAST MEDICAL HISTORY:   1. Significant for hypertension.   2. Hyperlipidemia.   3. Atrial flutter.   4. Coronary artery disease, status post coronary artery bypass graft procedure. X 4 - 2001 in [place]  5. Benign prostatic hypertrophy.     Prior to hospitalization, the patient has been taking   1. Sotalol 120 mg twice a day.   2. Vytorin 10/40 mg a day.   3. Warfarin 2.5 mg a day.   4. Multivitamin once a day.   5. Saw palmetto supplement once a day.     SSCP has quieted.   He favored lexiscan over acute cath.   I agree.  Definite USA?SEMI - location unclear due to paced rhythm  Cath for ongong USA or extensive ischemia

## 2017-05-18 NOTE — PLAN OF CARE
Problem: Goal Outcome Summary  Goal: Goal Outcome Summary  Outcome: No Change  Patient Progress:  No Change  Outcome Summary:  Temp: 97.3  F (36.3  C) Temp src: Oral BP: 124/81 Pulse: 62 Heart Rate: 63 Resp: 18 SpO2: 95 % O2 Device: None (Room air)       VSS, A&O x4, up with SBA. Troponin elevated. Pt went to lexiscan stress test. Waiting results and what cardiology would like to do after stress test. Pt denies chest pain, and SOB. Tele is A-fib 100% v-paced.

## 2017-05-18 NOTE — PROGRESS NOTES
Pre-procedure:    Initial vital signs: /94, HR 65, RR 16  Allergies: reviewed   Rhythm: A-paced and V-paced  Medications taken within 48 hours of procedure: NA   Last Caffeine: morning  Lung sounds: CTA, no wheezing, crackles or rtx  Health History (COPD, Asthma, etc): NA    Procedure: Lexiscan  Reaction/symptoms after receiving Jo injection: SOB  Vital Signs:/56, HR 61, RR 18  Reversal agent: N/A    Post:   Resolution of symptoms?: YES  Vital signs: /70, HR 62, RR 16  Walk: NO  Return to Radiology

## 2017-05-18 NOTE — PROVIDER NOTIFICATION
Page to hospitalist:     room 353, Pt complaint that his chest does not feel normal, cannot describe what is going on. EKG done. Can you please look and advise. Thank you!

## 2017-05-18 NOTE — PROGRESS NOTES
Paged re patient with uneasy feeling in his chest.  Not really painful necessarily.  He is here with NSTEMI and has significant coronary history s/p CABG.  A repeat EKG was obtained which is unchanged from previous and I do not see any clear ST elevation or depression.  His SBP is 120's.  His BP seemed to tolerate a dose of nitro in ED without significant drop.  Currently anticoagulated on warfarin with INR of 2.9.  -recommend dose of sublingual nitro and see if this improves symptoms  -cardiology to see patient tomorrow

## 2017-05-18 NOTE — PROVIDER NOTIFICATION
Notified MD ESCOTO room 353. Critical trop called. Was 1.349, is now 6.827. Please advise of change in care. Thank you!

## 2017-05-18 NOTE — PHARMACY-ADMISSION MEDICATION HISTORY
Admission medication history interview status for this patient is complete. See [facility] admission navigator for allergy information, prior to admission medications and immunization status.     Medication history interview source(s):Patient and Family  Medication history resources (including written lists, pill bottles, clinic record):None  Primary pharmacy:[facility]    Changes made to PTA medication list:  Added: preservision  Deleted: systane  Changed: -    Actions taken by pharmacist (provider contacted, etc):None     Additional medication history information:None    Medication reconciliation/reorder completed by provider prior to medication history? No    For patients on insulin therapy: No   Lantus/levemir/NPH/Mix 70/30 dose:  _____   in AM/PM  or twice daily   Sliding scale Novolog Y/N  If Yes, do you have a baseline novolog pre-meal dose:  ______units with meals   Patients eat three meals a day:   Y/N     Any Barriers to therapy:  cost of medications/comfortable with giving injections (if applicable)/ comfortable and confident with current diabetes regimen       Prior to Admission medications    Medication Sig Last Dose Taking? Auth Provider   SOTALOL HCL PO Take 120 mg by mouth 2 times daily 5/17/2017 at x1 Yes Reported, Patient   ezetimibe-simvastatin (VYTORIN) 10-40 MG per tablet Take 1 tablet by mouth At Bedtime 5/16/2017 at Unknown time Yes Reported, Patient   WARFARIN SODIUM PO Take 2.5 mg by mouth daily  5/16/2017 at Unknown time Yes Reported, Patient   multivitamin, therapeutic (THERA-VIT) TABS tablet Take 1 tablet by mouth daily 5/17/2017 at Unknown time Yes Reported, Patient   Saw Palmetto, Serenoa repens, (SAW PALMETTO CONCENTRATE OR) Take 1 tablet by mouth daily  5/17/2017 at Unknown time Yes Reported, Patient   Multiple Vitamins-Minerals (PRESERVISION AREDS PO) Take 1 tablet by mouth daily 5/17/2017 at Unknown time Yes Unknown, Entered By History

## 2017-05-18 NOTE — PROVIDER NOTIFICATION
Page to hospitalist:     room 353, critical troponin called. Was 0.534 and is now 1.349. Please advice of any changes in care. Thank you!

## 2017-05-18 NOTE — H&P
CHIEF COMPLAINT:  Chest pain.      HISTORY OF PRESENT ILLNESS:  Fritz Kumar is an 86-year-old male with history of multiple previous heart attacks and previous coronary artery bypass graft procedure who has not had any problems since his coronary artery bypass graft several years ago.  Developed chest pain today around 10:00 a.m., has had intermittent pain throughout the day, has not noted anything that has made the pain better or worse.  The pain is a pressure sensation in the center of his chest that does not seem to radiate anywhere else.  Has felt some nausea with this, but has not had any shortness of breath, no cough, no fevers or chills.  No other complaints at this time, and also he has not been around anyone else sick that he knows of.      PAST MEDICAL HISTORY:   1.  Significant for hypertension.   2.  Hyperlipidemia.   3.  Atrial flutter.   4.  Coronary artery disease, status post coronary artery bypass graft procedure.   5.  Benign prostatic hypertrophy.      ALLERGIES:  The patient is allergic to SULFA.      MEDICATIONS:  Prior to hospitalization, the patient has been taking   1.  Sotalol 120 mg twice a day.   2.  Vytorin 10/40 mg a day.   3.  Warfarin 2.5 mg a day.   4.  Multivitamin once a day.     5.  Saw palmetto supplement once a day.      SOCIAL HISTORY:  The patient quit smoking 40 years ago, does drink 1-2 alcoholic beverages most days of the week.      FAMILY HISTORY:  Mother and father both with coronary artery disease, no cancer in the family that he knows of.      REVIEW OF SYSTEMS:  Positive for chest pain and nausea as noted above.  Otherwise, a 10-point review of systems is negative for acute problems.      PHYSICAL EXAMINATION:   VITAL SIGNS:  Today show a temperature of 97 degrees Fahrenheit, heart rate 69, blood pressure 145/98, respiratory rate 20, oxygen saturation 96%.   GENERAL:  The patient is well-developed, well-nourished, no acute distress, awake, alert and oriented x3.    HEENT:  Head is normocephalic, atraumatic.  Eyes:  Pupils equal, round, react to light.  Extraocular muscles intact.  Sclerae are nonicteric.  Oropharynx has moist mucous membranes, no lesions.   NECK:  Supple, no masses.   HEART:  Regular, no murmurs.   LUNGS:  Clear to auscultation bilaterally.  The patient is using normal respiratory effort to breathe, no accessory muscle use.   ABDOMEN:  Has positive bowel sounds, soft, nontender to palpation, nondistended.   SKIN:  Warm and dry to touch.  No rash over examined skin.   EXTREMITIES:  No pitting edema in the lower extremities bilaterally.  No cyanosis.   NEUROLOGIC:  Cranial nerves II-XII are grossly intact.  The patient moves all 4 extremities.   PSYCHIATRIC:  The patient has appropriate affect, oriented to person, place and time.      LABORATORY DATA:  Metabolic panel today is unremarkable.  Troponin is mildly elevated at 0.076.  Complete blood count is unremarkable.  INR is 2.9.  Chest x-ray shows elevated left hemidiaphragm.  There is a left lower lobe infiltrate and patchy infiltrate at the right lung base.      ASSESSMENT AND PLAN:  An 86-year-old male with non-ST elevation myocardial infarction.   1.  Non-ST elevation myocardial infarction:  Technically, patient does have a non-STEMI at this time due to elevated troponin with his chest pain.  We will check serial troponins, have the patient on telemetry.  He is already anticoagulated with warfarin.  We will add aspirin 81 mg a day at this time.  Continue the patient on his Vytorin.  Continue his sotalol.  Add lisinopril 5 mg a day.  Have him seen in consultation by Cardiology.   2.  Possible pneumonia:  The patient does have an infiltrate on chest x-ray; however, no other symptoms that would be convincing for pneumonia.  It is possible that his chest pain and pneumonia could be due to pneumonia.  He does not have an elevated white count, is not febrile,  has not been coughing, has not been short of  breath.  We will hold off on antibiotics at this time.  Check a procalcitonin level.   3.  Hyperlipidemia:  Continue Vytorin.   4.  Hypertension:  Add lisinopril 5 mg a day.  Have IV hydralazine available as needed.  Continue to monitor blood pressure and may need further medication adjustment.  He is already on sotalol would not add further beta blockade at this time due to his sotalol.   5.  Atrial flutter:  Continue sotalol, continue warfarin.   6.  Deep venous thrombosis prophylaxis:  Patient is on warfarin and INR is therapeutic.         GUANAKITO TRINH DO             D: 2017 21:43   T: 2017 22:23   MT: [id number]      Name:     ISHAAN MEANS   MRN:      [id number]        Account:      [id number]   :      1930           Admitted:     [id number]      Document: [id number]

## 2017-05-19 VITALS
DIASTOLIC BLOOD PRESSURE: 55 MMHG | RESPIRATION RATE: 16 BRPM | OXYGEN SATURATION: 95 % | BODY MASS INDEX: 26.05 KG/M2 | WEIGHT: 182 LBS | SYSTOLIC BLOOD PRESSURE: 93 MMHG | TEMPERATURE: 97.1 F | HEART RATE: 62 BPM | HEIGHT: 70 IN

## 2017-05-19 LAB — INR PPP: 2.76 (ref 0.86–1.14)

## 2017-05-19 PROCEDURE — 36415 COLL VENOUS BLD VENIPUNCTURE: CPT | Performed by: INTERNAL MEDICINE

## 2017-05-19 PROCEDURE — 99239 HOSP IP/OBS DSCHRG MGMT >30: CPT | Mod: 25 | Performed by: INTERNAL MEDICINE

## 2017-05-19 PROCEDURE — 99232 SBSQ HOSP IP/OBS MODERATE 35: CPT | Performed by: INTERNAL MEDICINE

## 2017-05-19 PROCEDURE — 85610 PROTHROMBIN TIME: CPT | Performed by: INTERNAL MEDICINE

## 2017-05-19 PROCEDURE — 25000132 ZZH RX MED GY IP 250 OP 250 PS 637: Mod: GY | Performed by: PHYSICIAN ASSISTANT

## 2017-05-19 PROCEDURE — 25000132 ZZH RX MED GY IP 250 OP 250 PS 637: Mod: GY | Performed by: INTERNAL MEDICINE

## 2017-05-19 PROCEDURE — A9270 NON-COVERED ITEM OR SERVICE: HCPCS | Mod: GY | Performed by: INTERNAL MEDICINE

## 2017-05-19 PROCEDURE — A9270 NON-COVERED ITEM OR SERVICE: HCPCS | Mod: GY | Performed by: PHYSICIAN ASSISTANT

## 2017-05-19 RX ORDER — ISOSORBIDE MONONITRATE 30 MG/1
15 TABLET, EXTENDED RELEASE ORAL DAILY
Qty: 30 TABLET | Refills: 1 | Status: SHIPPED | OUTPATIENT
Start: 2017-05-19

## 2017-05-19 RX ORDER — WARFARIN SODIUM 2.5 MG/1
2.5 TABLET ORAL
Status: DISCONTINUED | OUTPATIENT
Start: 2017-05-19 | End: 2017-05-19 | Stop reason: HOSPADM

## 2017-05-19 RX ADMIN — ASPIRIN 81 MG: 81 TABLET, COATED ORAL at 08:00

## 2017-05-19 RX ADMIN — Medication 15 MG: at 12:27

## 2017-05-19 RX ADMIN — SOTALOL HYDROCHLORIDE 120 MG: 120 TABLET ORAL at 08:00

## 2017-05-19 NOTE — DISCHARGE SUMMARY
PRIMARY CARE PHYSICIAN:  NONE GIVEN.         DATE OF ADMISSION:  05/17/2017.       DATE OF DISCHARGE:  05/19/2017.       PRINCIPAL FINAL DIAGNOSIS:  Non-ST elevation myocardial infarction.  The patient had a nuclear medicine stress test this admission with plans for medical management.  Appreciate Cardiology input this admission.      PAST MEDICAL HISTORY:   1.  Atrial flutter, maintained on sotalol and warfarin.   2.  Hypertension.   3.  Hyperlipidemia.   4.  Coronary artery disease, status post coronary bypass graft procedure this admission.   5.  History of BPH.       PRINCIPAL PROCEDURES THIS ADMISSION:   1.  Cardiology consultation.   2.  Nuclear medicine stress test showing a moderate area of possible infarct in the anterior wall with trivial mild kinsey-infarct ischemia.   3.  Chest x-ray.  This showed a left lower lobe infiltrate, suspect atelectasis.      REASON FOR ADMISSION:  Please see dictated history and physical by Dr. Don Tavera.  In brief, Mr. Fritz Kumar is an 86-year-old male with a history of hypertension, coronary artery disease, who presented with chest pain.  Please see dictated history and physical for details.      HOSPITAL COURSE:  Non-ST elevation myocardial infarction:  The patient was seen by Cardiology this admission.  He was treated medically.  Nuclear medicine stress test was done showing a mild kinsey-infarct ischemia.  Angiogram was not performed.  The plan is for medical management.  Imdur was added to his medical regimen.  The patient was not discharged on an ACE inhibitor as he had soft blood pressures below a systolic blood pressure of 100.  He also has no known history of congestive heart failure.      The patient resides in [place].  He is advised to follow up with his primary care physician when he returns to his home state.  I provided him a copy of the nuclear medicine stress test report on discharge.      DISCHARGE MEDICATIONS:   1.  Aspirin 81 mg daily.   2.   Imdur 15 mg daily.   3.  Vytorin 10/40 one tablet at bedtime.   4.  Multivitamin daily.   5.  PreserVision 1 tablet daily.   6.  Saw palmetto.   7.  Sotalol 120 mg twice a day.   8.  Warfarin 2.5 mg daily.      NEW MEDICATIONS:  Include aspirin and Imdur.      Again, ACE inhibitor or angiotensin receptor blocker was not added this patient's discharge medications given low blood pressures.         I saw and examined the patient on day of discharge.  I spent greater than 30 minutes discharging this patient today.         LINCOLN PARTIDA MD             D: 2017 16:45   T: 2017 17:17   MT: [id number]      Name:     ISHAAN MEANS   MRN:      [id number]        Account:        [id number]   :      1930           Admit Date:     [id number]                                  Discharge Date: 2017      Document: [id number]

## 2017-05-19 NOTE — PHARMACY-ANTICOAGULATION SERVICE
Clinical Pharmacy- Warfarin Discharge Note  This patient is currently on warfarin for the treatment of Atrial fibrillation.  INR Goal= 2-3  Expected length of therapy undetermined.    Anticoagulation Dose History     Recent Dosing and Labs Latest Ref Rng & Units 5/17/2017 5/18/2017 5/19/2017    Warfarin 2.5 mg - 2.5 mg 2.5 mg -    INR 0.86 - 1.14 2.90(H) 2.90(H) 2.76(H)        Agree with MD discharging the patient on a warfarin home regimen of 2.5mg daily     The patient should have an INR checked in 3-7 days.

## 2017-05-19 NOTE — PROGRESS NOTES
Xcover:  Notified about asymptomatic soft BP levels  No new orders. May provided IVF bolus if with persistent low BP levels.

## 2017-05-19 NOTE — PLAN OF CARE
Problem: Cardiac: Acute Coronary Syndrome (ACS) (Adult)  Goal: Signs and Symptoms of Listed Potential Problems Will be Absent or Manageable (Cardiac: Acute Coronary Syndrome)  Signs and symptoms of listed potential problems will be absent or manageable by discharge/transition of care (reference Cardiac: Acute Coronary Syndrome (ACS) (Adult) CPG).   Outcome: Adequate for Discharge Date Met:  05/19/17  Pt alert and oriented X 4. Apical pulse regular, tele NSR. Pt denies chest pain or SOB. LS clear throughout all lobes. Abdomen soft, active BS. Pt reports no difficulty with voiding. Pt walked in hallways -120's, pt denies chest pain with ambulation. Mild SOB. Start Imdur, D/C today.     Pt SBP, post 15mg Imdur, 90/54, 81/50, 93/55. CARDS, Dr. Zamora notified, ok to D/C on Imdur and follow up with home Cardiologist.

## 2017-05-19 NOTE — PROGRESS NOTES
[facility]    Cardiology Progress Note    Date of Service (when I saw the patient): 05/19/2017    Summary: Fritz Kumar Sr. is a 86 year old male from [place](flew in 5/17/17 to visit family) with history of CAD s/p CABG (4 or 5 vessel), HTN, hyperlipidemia, atrial flutter on sotalol, who was admitted on 5/17/2017 with ongoing chest discomfort. He is active, exercising 6 days a week on an exercise bike and has had no symptoms. Onset of CP 5/18/17 around 1000, continued and about 1600 presented to ED. SL nitro in ED did not offer much relief. EKG was not helpful as he has both an A-paced/V-paced rhythm with intermittent A-sense and a V-paced rhythm. Troponins were 0.534, 1.349, and then 6.827.          Interval History   No CP. Tells me he had a small heart attack after his bypass and they did an angiogram that showed his grafts were open and it was a small branch that occluded.   Tele: Has been SR       Assessment & Plan   Chest pain  NSTEMI  - trops 0.534, 1.349, and then 6.827.    - EKG paced rhythm  - Nuc stress test mildly abnormal with moderate area of possible infarct in the anterior wall with trivial, mild kinsey-infarct ischemia  - No further chest pain at rest or with ambulation   - Will medically manage at this time. Add ASA 81mg for the short term (is also on warfarin) until he gets back to [place], continue sotalol, statin. BP low overnight with addition of lisinopril. Will discontinue lisinopril to allow for low dose Imdur 15mg. Discussed with patient and wife.   - If recurrent chest pain while here in [place], low threshold to cath  - Should follow up with his cardiologist in [place] when he returns. No echo here, can have there. They plan to sign a LEANN before they discharge.     Atrial flutter  - On sotalol and warfarin. Adding ASA 81mg for NSTEMI for short term   - Also s/p PPM    HTN  - Home med sotalol alone  - Lisinopril added here. Had some hypotension. Will DC lisinopril and use Imdur  instead for medical management of CAD    Hyperlipidemia  - Continue home med vytorin 10-40mg    DISPO: Ok to DC today from cardiology perspective. We discussed avoiding significant exertion while he is in [place]. Adding Imdur 15mg and ASA 81mg for now.     SWEETIE Zamora md  Full note reviewed, EXAMINED, DISCUSSED AND PROMOTED AS ABOVE AND BELOW      Patient Active Problem List   Diagnosis     NSTEMI (non-ST elevated myocardial infarction) (H)       Physical Exam   Temp: 97.1  F (36.2  C) Temp src: Oral BP: 117/71   Heart Rate: 64 Resp: 16 SpO2: 95 % O2 Device: None (Room air)    Vitals:    05/17/17 1653   Weight: 82.6 kg (182 lb)     Vital Signs with Ranges  Temp:  [96.5  F (35.8  C)-98.2  F (36.8  C)] 97.1  F (36.2  C)  Heart Rate:  [62-70] 64  Resp:  [16-18] 16  BP: ()/(58-74) 117/71  SpO2:  [94 %-96 %] 95 %  I/O last 3 completed shifts:  In: 3 [I.V.:3]  Out: -     Constitutional: NAD.   Respiratory: CTAB.   Cardiovascular: RRR, s1s2, no sig murmur  GI: soft, BS+  Skin: warm, no rashes  Musculoskeletal: Moving all extremities  Neurologic: Alert, oriented x 3  Neuropsychiatric: Normal affect       Data     Recent Labs  Lab 05/19/17  0559 05/18/17  0942 05/18/17  0556 05/18/17  0200 05/17/17  2205 05/17/17  1730   WBC  --   --   --   --   --  6.7   HGB  --   --   --   --   --  13.9   MCV  --   --   --   --   --  94   PLT  --   --   --   --   --  172   INR 2.76*  --  2.90*  --   --  2.90*   NA  --   --  139  --   --  139   POTASSIUM  --   --  4.3  --   --  4.6   CHLORIDE  --   --  106  --   --  106   CO2  --   --  28  --   --  29   BUN  --   --  16  --   --  16   CR  --   --  0.75  --   --  0.78   ANIONGAP  --   --  5  --   --  4   AD  --   --  8.4*  --   --  8.5   GLC  --   --  89  --   --  94   TROPI  --  6.827*  --  1.349* 0.534* 0.076*       Medications     Warfarin Therapy Reminder       - MEDICATION INSTRUCTIONS -         sotalol (BETAPACE) tablet 120 mg  120 mg Oral BID     aspirin EC  81 mg Oral Daily      sodium chloride (PF)  3 mL Intracatheter Q8H     lisinopril  5 mg Oral Daily     ezetimibe  10 mg Oral At Bedtime    And     simvastatin  40 mg Oral At Bedtime

## 2017-05-19 NOTE — PLAN OF CARE
Problem: Goal Outcome Summary  Goal: Goal Outcome Summary   A&OX4. BP low at 80/58. Trending up with last reading at 99/63. Complains of dizziness upon standing but is otherwise asymptomatic. Tele 100% V paced. Denies chest pain or dyspnea. Lung sounds diminished. Jo scan completed. Ambulates with assist of one.      2054: MD paged: BP 80/58. Asymptomatic.

## 2017-05-19 NOTE — PLAN OF CARE
Patient alert and oriented x 4, calls appropriately for assistance. Tele 100% V paced denies chest pain.  LS diminished,  denies SoB, Up with  SBA for toiletting void x 2. Has been NPO since midnight due to possible angiogram.

## 2017-05-22 ENCOUNTER — CARE COORDINATION (OUTPATIENT)
Dept: CARDIOLOGY | Facility: CLINIC | Age: 82
End: 2017-05-22

## 2017-05-22 NOTE — PROGRESS NOTES
"Call attempted to reach patient to discuss discharge instructions. Unable to reach patient or spouse. Phone number not working, \"the number has been disconnected\". No other contact number available.  "